# Patient Record
Sex: FEMALE | Race: BLACK OR AFRICAN AMERICAN | Employment: FULL TIME | URBAN - METROPOLITAN AREA
[De-identification: names, ages, dates, MRNs, and addresses within clinical notes are randomized per-mention and may not be internally consistent; named-entity substitution may affect disease eponyms.]

---

## 2018-01-03 PROBLEM — E66.01 OBESITY, MORBID (HCC): Status: ACTIVE | Noted: 2018-01-03

## 2018-06-26 PROBLEM — G25.81 RLS (RESTLESS LEGS SYNDROME): Status: ACTIVE | Noted: 2018-06-26

## 2018-07-05 PROBLEM — R73.03 PREDIABETES: Status: ACTIVE | Noted: 2018-07-05

## 2018-08-15 ENCOUNTER — HOSPITAL ENCOUNTER (OUTPATIENT)
Dept: MAMMOGRAPHY | Age: 53
Discharge: HOME | End: 2018-08-15
Attending: NURSE PRACTITIONER
Payer: COMMERCIAL

## 2018-08-15 DIAGNOSIS — Z12.39 SCREENING FOR BREAST CANCER: ICD-10-CM

## 2018-08-15 PROCEDURE — 77067 SCR MAMMO BI INCL CAD: CPT

## 2018-08-15 NOTE — PROGRESS NOTES
Abnormal finding in right breast. Will need repeat imaging and possible ultrasound. [facility]

## 2018-08-21 ENCOUNTER — HOSPITAL ENCOUNTER (OUTPATIENT)
Dept: MAMMOGRAPHY | Age: 53
Discharge: HOME | End: 2018-08-21
Attending: NURSE PRACTITIONER
Payer: COMMERCIAL

## 2018-08-21 DIAGNOSIS — N63.14 BREAST LUMP ON RIGHT SIDE AT 5 O'CLOCK POSITION: ICD-10-CM

## 2018-08-21 PROCEDURE — 77065 DX MAMMO INCL CAD UNI: CPT

## 2018-08-21 PROCEDURE — 76642 ULTRASOUND BREAST LIMITED: CPT

## 2018-08-31 ENCOUNTER — HOSPITAL ENCOUNTER (OUTPATIENT)
Dept: MAMMOGRAPHY | Age: 53
Discharge: HOME | End: 2018-08-31
Attending: NURSE PRACTITIONER
Payer: COMMERCIAL

## 2018-08-31 VITALS — DIASTOLIC BLOOD PRESSURE: 90 MMHG | HEART RATE: 75 BPM | SYSTOLIC BLOOD PRESSURE: 180 MMHG

## 2018-08-31 DIAGNOSIS — N63.10 MASS OF RIGHT BREAST: ICD-10-CM

## 2018-08-31 DIAGNOSIS — R92.8 ABNORMAL MAMMOGRAM OF RIGHT BREAST: ICD-10-CM

## 2018-08-31 PROCEDURE — 88305 TISSUE EXAM BY PATHOLOGIST: CPT

## 2018-08-31 PROCEDURE — 77065 DX MAMMO INCL CAD UNI: CPT

## 2018-08-31 PROCEDURE — 19083 BX BREAST 1ST LESION US IMAG: CPT

## 2018-08-31 PROCEDURE — A4648 IMPLANTABLE TISSUE MARKER: HCPCS

## 2018-08-31 PROCEDURE — 74011250636 HC RX REV CODE- 250/636

## 2018-08-31 RX ORDER — LIDOCAINE HYDROCHLORIDE 10 MG/ML
6 INJECTION INFILTRATION; PERINEURAL
Status: COMPLETED | OUTPATIENT
Start: 2018-08-31 | End: 2018-08-31

## 2018-08-31 RX ADMIN — LIDOCAINE HYDROCHLORIDE 50 ML: 10 INJECTION, SOLUTION INFILTRATION; PERINEURAL at 09:06

## 2018-10-22 PROBLEM — M05.79 RHEUMATOID ARTHRITIS INVOLVING MULTIPLE SITES WITH POSITIVE RHEUMATOID FACTOR (HCC): Status: ACTIVE | Noted: 2018-10-22

## 2018-10-22 PROBLEM — E66.01 OBESITY, MORBID (HCC): Chronic | Status: ACTIVE | Noted: 2018-01-03

## 2018-11-05 ENCOUNTER — HOSPITAL ENCOUNTER (OUTPATIENT)
Dept: MRI IMAGING | Age: 53
Discharge: HOME | End: 2018-11-05
Attending: NURSE PRACTITIONER
Payer: COMMERCIAL

## 2018-11-05 DIAGNOSIS — R26.9 GAIT ABNORMALITY: ICD-10-CM

## 2018-11-05 DIAGNOSIS — R41.3 MEMORY LOSS: ICD-10-CM

## 2018-11-05 PROCEDURE — 70551 MRI BRAIN STEM W/O DYE: CPT

## 2018-11-05 NOTE — PROGRESS NOTES
Mild left ethmoid sinus disease - could be acute or chronic - will treat with Amoxicillin and Flonase. Otherwise unremarkable MRI of brain.

## 2022-03-18 PROBLEM — E66.01 OBESITY, MORBID (HCC): Status: ACTIVE | Noted: 2018-01-03

## 2022-03-19 PROBLEM — M05.79 RHEUMATOID ARTHRITIS INVOLVING MULTIPLE SITES WITH POSITIVE RHEUMATOID FACTOR (HCC): Status: ACTIVE | Noted: 2018-10-22

## 2022-03-19 PROBLEM — R73.03 PREDIABETES: Status: ACTIVE | Noted: 2018-07-05

## 2022-03-20 PROBLEM — G25.81 RLS (RESTLESS LEGS SYNDROME): Status: ACTIVE | Noted: 2018-06-26

## 2024-01-05 ENCOUNTER — APPOINTMENT (OUTPATIENT)
Dept: GENERAL RADIOLOGY | Age: 59
End: 2024-01-05
Payer: COMMERCIAL

## 2024-01-05 ENCOUNTER — HOSPITAL ENCOUNTER (EMERGENCY)
Age: 59
Discharge: HOME | End: 2024-01-05
Attending: EMERGENCY MEDICINE
Payer: COMMERCIAL

## 2024-01-05 VITALS
SYSTOLIC BLOOD PRESSURE: 157 MMHG | HEART RATE: 67 BPM | BODY MASS INDEX: 40.85 KG/M2 | DIASTOLIC BLOOD PRESSURE: 96 MMHG | WEIGHT: 222 LBS | HEIGHT: 62 IN | RESPIRATION RATE: 21 BRPM | TEMPERATURE: 97.3 F | OXYGEN SATURATION: 97 %

## 2024-01-05 DIAGNOSIS — U07.1 COVID-19: Primary | ICD-10-CM

## 2024-01-05 DIAGNOSIS — M54.12 CERVICAL RADICULOPATHY: ICD-10-CM

## 2024-01-05 PROBLEM — M79.7 PRIMARY FIBROMYALGIA SYNDROME: Status: ACTIVE | Noted: 2023-06-28

## 2024-01-05 LAB
ALBUMIN SERPL-MCNC: 3.4 G/DL (ref 3.5–5)
ALBUMIN/GLOB SERPL: 0.8 (ref 0.4–1.6)
ALP SERPL-CCNC: 65 U/L (ref 50–136)
ALT SERPL-CCNC: 31 U/L (ref 12–65)
ANION GAP SERPL CALC-SCNC: 4 MMOL/L (ref 2–11)
AST SERPL-CCNC: 14 U/L (ref 15–37)
BASOPHILS # BLD: 0 K/UL (ref 0–0.2)
BASOPHILS NFR BLD: 0 % (ref 0–2)
BILIRUB SERPL-MCNC: 0.4 MG/DL (ref 0.2–1.1)
BUN SERPL-MCNC: 14 MG/DL (ref 6–23)
CALCIUM SERPL-MCNC: 8.8 MG/DL (ref 8.3–10.4)
CHLORIDE SERPL-SCNC: 106 MMOL/L (ref 103–113)
CO2 SERPL-SCNC: 28 MMOL/L (ref 21–32)
CREAT SERPL-MCNC: 0.9 MG/DL (ref 0.6–1)
DIFFERENTIAL METHOD BLD: ABNORMAL
EOSINOPHIL # BLD: 0.1 K/UL (ref 0–0.8)
EOSINOPHIL NFR BLD: 1 % (ref 0.5–7.8)
ERYTHROCYTE [DISTWIDTH] IN BLOOD BY AUTOMATED COUNT: 14.1 % (ref 11.9–14.6)
GLOBULIN SER CALC-MCNC: 4.1 G/DL (ref 2.8–4.5)
GLUCOSE SERPL-MCNC: 124 MG/DL (ref 65–100)
HCT VFR BLD AUTO: 37.9 % (ref 35.8–46.3)
HGB BLD-MCNC: 12.6 G/DL (ref 11.7–15.4)
IMM GRANULOCYTES # BLD AUTO: 0.2 K/UL (ref 0–0.5)
IMM GRANULOCYTES NFR BLD AUTO: 1 % (ref 0–5)
LYMPHOCYTES # BLD: 2.5 K/UL (ref 0.5–4.6)
LYMPHOCYTES NFR BLD: 22 % (ref 13–44)
MAGNESIUM SERPL-MCNC: 2.1 MG/DL (ref 1.8–2.4)
MCH RBC QN AUTO: 29 PG (ref 26.1–32.9)
MCHC RBC AUTO-ENTMCNC: 33.2 G/DL (ref 31.4–35)
MCV RBC AUTO: 87.3 FL (ref 82–102)
MONOCYTES # BLD: 0.9 K/UL (ref 0.1–1.3)
MONOCYTES NFR BLD: 8 % (ref 4–12)
NEUTS SEG # BLD: 7.9 K/UL (ref 1.7–8.2)
NEUTS SEG NFR BLD: 68 % (ref 43–78)
NRBC # BLD: 0 K/UL (ref 0–0.2)
PLATELET # BLD AUTO: 253 K/UL (ref 150–450)
PMV BLD AUTO: 9.7 FL (ref 9.4–12.3)
POTASSIUM SERPL-SCNC: 3.5 MMOL/L (ref 3.5–5.1)
PROT SERPL-MCNC: 7.5 G/DL (ref 6.3–8.2)
RBC # BLD AUTO: 4.34 M/UL (ref 4.05–5.2)
SARS-COV-2 RDRP RESP QL NAA+PROBE: DETECTED
SODIUM SERPL-SCNC: 138 MMOL/L (ref 136–146)
SOURCE: ABNORMAL
WBC # BLD AUTO: 11.6 K/UL (ref 4.3–11.1)

## 2024-01-05 PROCEDURE — 83735 ASSAY OF MAGNESIUM: CPT

## 2024-01-05 PROCEDURE — 87635 SARS-COV-2 COVID-19 AMP PRB: CPT

## 2024-01-05 PROCEDURE — 96374 THER/PROPH/DIAG INJ IV PUSH: CPT

## 2024-01-05 PROCEDURE — 72040 X-RAY EXAM NECK SPINE 2-3 VW: CPT

## 2024-01-05 PROCEDURE — 93005 ELECTROCARDIOGRAM TRACING: CPT | Performed by: EMERGENCY MEDICINE

## 2024-01-05 PROCEDURE — 80053 COMPREHEN METABOLIC PANEL: CPT

## 2024-01-05 PROCEDURE — 71046 X-RAY EXAM CHEST 2 VIEWS: CPT

## 2024-01-05 PROCEDURE — 85025 COMPLETE CBC W/AUTO DIFF WBC: CPT

## 2024-01-05 PROCEDURE — 6360000002 HC RX W HCPCS: Performed by: EMERGENCY MEDICINE

## 2024-01-05 PROCEDURE — 96375 TX/PRO/DX INJ NEW DRUG ADDON: CPT

## 2024-01-05 PROCEDURE — 99285 EMERGENCY DEPT VISIT HI MDM: CPT

## 2024-01-05 PROCEDURE — 73030 X-RAY EXAM OF SHOULDER: CPT

## 2024-01-05 RX ORDER — METHOCARBAMOL 750 MG/1
750 TABLET, FILM COATED ORAL 4 TIMES DAILY
Qty: 40 TABLET | Refills: 0 | Status: SHIPPED | OUTPATIENT
Start: 2024-01-05 | End: 2024-01-15

## 2024-01-05 RX ORDER — KETOROLAC TROMETHAMINE 30 MG/ML
30 INJECTION, SOLUTION INTRAMUSCULAR; INTRAVENOUS ONCE
Status: COMPLETED | OUTPATIENT
Start: 2024-01-05 | End: 2024-01-05

## 2024-01-05 RX ORDER — GUAIFENESIN AND DEXTROMETHORPHAN HYDROBROMIDE 1200; 60 MG/1; MG/1
1 TABLET, EXTENDED RELEASE ORAL 2 TIMES DAILY
Qty: 28 TABLET | Refills: 0 | Status: SHIPPED | OUTPATIENT
Start: 2024-01-05

## 2024-01-05 RX ORDER — METHYLPREDNISOLONE 4 MG/1
TABLET ORAL
Qty: 1 KIT | Refills: 0 | Status: SHIPPED | OUTPATIENT
Start: 2024-01-05

## 2024-01-05 RX ORDER — BENZONATATE 200 MG/1
200 CAPSULE ORAL 3 TIMES DAILY PRN
Qty: 30 CAPSULE | Refills: 0 | Status: SHIPPED | OUTPATIENT
Start: 2024-01-05 | End: 2024-01-12

## 2024-01-05 RX ORDER — DEXAMETHASONE SODIUM PHOSPHATE 10 MG/ML
10 INJECTION INTRAMUSCULAR; INTRAVENOUS
Status: COMPLETED | OUTPATIENT
Start: 2024-01-05 | End: 2024-01-05

## 2024-01-05 RX ORDER — HYDROCODONE BITARTRATE AND HOMATROPINE METHYLBROMIDE ORAL SOLUTION 5; 1.5 MG/5ML; MG/5ML
5 LIQUID ORAL EVERY 6 HOURS PRN
Qty: 100 ML | Refills: 0 | Status: SHIPPED | OUTPATIENT
Start: 2024-01-05 | End: 2024-01-10

## 2024-01-05 RX ORDER — NAPROXEN 500 MG/1
500 TABLET ORAL 2 TIMES DAILY WITH MEALS
Qty: 28 TABLET | Refills: 0 | Status: SHIPPED | OUTPATIENT
Start: 2024-01-05 | End: 2024-01-19

## 2024-01-05 RX ADMIN — DEXAMETHASONE SODIUM PHOSPHATE 10 MG: 10 INJECTION INTRAMUSCULAR; INTRAVENOUS at 17:13

## 2024-01-05 RX ADMIN — KETOROLAC TROMETHAMINE 30 MG: 30 INJECTION, SOLUTION INTRAMUSCULAR; INTRAVENOUS at 17:12

## 2024-01-05 ASSESSMENT — ENCOUNTER SYMPTOMS
NAUSEA: 0
EYE ITCHING: 0
ABDOMINAL PAIN: 0
CONSTIPATION: 0
EYE PAIN: 0
SINUS PAIN: 0
VOMITING: 0
DIARRHEA: 0
EYE REDNESS: 0
BACK PAIN: 1
COUGH: 1
WHEEZING: 0
SHORTNESS OF BREATH: 0
TROUBLE SWALLOWING: 0

## 2024-01-05 ASSESSMENT — PAIN DESCRIPTION - LOCATION
LOCATION: GENERALIZED
LOCATION: ARM

## 2024-01-05 ASSESSMENT — PAIN SCALES - GENERAL
PAINLEVEL_OUTOF10: 8
PAINLEVEL_OUTOF10: 9

## 2024-01-05 ASSESSMENT — PAIN DESCRIPTION - ORIENTATION: ORIENTATION: LEFT

## 2024-01-05 ASSESSMENT — PAIN DESCRIPTION - DESCRIPTORS
DESCRIPTORS: ACHING
DESCRIPTORS: ACHING

## 2024-01-05 ASSESSMENT — LIFESTYLE VARIABLES
HOW MANY STANDARD DRINKS CONTAINING ALCOHOL DO YOU HAVE ON A TYPICAL DAY: PATIENT DOES NOT DRINK
HOW OFTEN DO YOU HAVE A DRINK CONTAINING ALCOHOL: NEVER

## 2024-01-05 ASSESSMENT — PAIN - FUNCTIONAL ASSESSMENT: PAIN_FUNCTIONAL_ASSESSMENT: 0-10

## 2024-01-05 NOTE — ED PROVIDER NOTES
making of this note.  This software is not perfect and grammatical and other typographical errors may be present.  This note has not been completely proofread for errors.     Chandu Rodriguez MD  01/05/24 1930

## 2024-01-05 NOTE — ED TRIAGE NOTES
Pt brought in by EMS from work c/o L sided arm weakness, L arm pain that radiates up her neck (x 6-8 hrs), and generalized body aches (9/10). Pt reports mild dizziness and blurry vision. Pt denies mechanical injury to arm. (+) cough, congestion. Pt reports she was flu + last week and has recently been exposed to Covid. PT A&O 4/4. GCS 15. Speech clear.

## 2024-01-06 LAB
EKG ATRIAL RATE: 72 BPM
EKG DIAGNOSIS: NORMAL
EKG P AXIS: 44 DEGREES
EKG P-R INTERVAL: 194 MS
EKG Q-T INTERVAL: 405 MS
EKG QRS DURATION: 89 MS
EKG QTC CALCULATION (BAZETT): 444 MS
EKG R AXIS: 8 DEGREES
EKG T AXIS: 46 DEGREES
EKG VENTRICULAR RATE: 72 BPM

## 2024-01-06 NOTE — ED NOTES
I have reviewed discharge instructions with the patient.  The patient verbalized understanding.    Patient left ED via Discharge Method: ambulatory to Home with family.    Opportunity for questions and clarification provided.       Patient given 6 scripts.         To continue your aftercare when you leave the hospital, you may receive an automated call from our care team to check in on how you are doing.  This is a free service and part of our promise to provide the best care and service to meet your aftercare needs.” If you have questions, or wish to unsubscribe from this service please call [phone].  Thank you for Choosing our [facility] Emergency Department.        Carmelita Wolfe, RN  01/05/24 3466

## 2024-01-06 NOTE — DISCHARGE INSTRUCTIONS
Follow COVID isolation protocol  Take medications as directed  Do not drive or operate machinery if you are taking the cough syrup with codeine  Drink plenty of fluids  Get plenty of rest    Call your doctor or the follow up doctor to set up appointment for recheck visit    Return to ER for any worsening symptoms or new problems which may arise

## 2024-01-08 ENCOUNTER — CARE COORDINATION (OUTPATIENT)
Dept: CARE COORDINATION | Facility: CLINIC | Age: 59
End: 2024-01-08

## 2024-01-08 NOTE — CARE COORDINATION
Date/Time:  1/8/2024 12:51 PM  Attempted to reach patient by telephone. Call within 2 business days of discharge: Yes Recording reached stating patient's number [phone] is not accepting calls. Unable to leave Voicemail.   Will attempt to reach patient again.

## 2024-01-09 ENCOUNTER — CARE COORDINATION (OUTPATIENT)
Dept: CARE COORDINATION | Facility: CLINIC | Age: 59
End: 2024-01-09

## 2024-01-09 NOTE — CARE COORDINATION
Date/Time:  1/9/2024 1:59 PM  Attempted to reach patient by telephone. Call within 2 business days of discharge: Yes Left HIPPA compliant message requesting a return call. Episode closed due to unable to reach patient.